# Patient Record
Sex: FEMALE
[De-identification: names, ages, dates, MRNs, and addresses within clinical notes are randomized per-mention and may not be internally consistent; named-entity substitution may affect disease eponyms.]

---

## 2019-01-24 PROBLEM — Z00.00 ENCOUNTER FOR PREVENTIVE HEALTH EXAMINATION: Status: ACTIVE | Noted: 2019-01-24

## 2019-02-19 ENCOUNTER — APPOINTMENT (OUTPATIENT)
Dept: OTOLARYNGOLOGY | Facility: CLINIC | Age: 39
End: 2019-02-19
Payer: COMMERCIAL

## 2019-02-19 VITALS
OXYGEN SATURATION: 98 % | HEIGHT: 64 IN | BODY MASS INDEX: 17.93 KG/M2 | WEIGHT: 105 LBS | DIASTOLIC BLOOD PRESSURE: 65 MMHG | HEART RATE: 70 BPM | SYSTOLIC BLOOD PRESSURE: 97 MMHG

## 2019-02-19 DIAGNOSIS — R22.0 LOCALIZED SWELLING, MASS AND LUMP, HEAD: ICD-10-CM

## 2019-02-19 DIAGNOSIS — Z87.891 PERSONAL HISTORY OF NICOTINE DEPENDENCE: ICD-10-CM

## 2019-02-19 PROCEDURE — 99202 OFFICE O/P NEW SF 15 MIN: CPT

## 2019-02-21 PROBLEM — R22.0 MASS OF TONGUE: Status: ACTIVE | Noted: 2019-02-21

## 2019-02-22 NOTE — HISTORY OF PRESENT ILLNESS
[FreeTextEntry1] : 37 y/o female with h/o midline dorsal tongue vascular lesion. Patient states the lesion was first noticed in year 2000. Pt states she was eating and her tongue began to bleed for hours, she proceeded to go to the ER. 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 Pt states after that episode it has bled about 1 x per year for the past 3 years. Pt uses pressure and/or salt water to stop the bleeding. 1.5 years ago in summer 2017 the lesion began to bleed and started to become very painful. The lesion increased in size and became more raised significantly about 1.5 years ago. Pt states the last time the lesion bled was a few months ago. Pt states she has pain which changes and increases with the weather, eating or menstruation. Pt went to an ENT doctor and MRI rx was given by Dr. Rod. Pt states Dr. Rod also ordered a CT scan and some other tests. Pt has also seen Dr. Rivera and Dr. Adrien Pitt, who felt the lesion should be surgically excised. Here for initial consultation.

## 2019-02-22 NOTE — PHYSICAL EXAM
[de-identified] : well adult. [de-identified] : midline dorsal mid tongue raised lesion (2 x 3 cm) with ulceration present

## 2019-02-22 NOTE — ASSESSMENT
[FreeTextEntry1] : 39 y/o female with h/o midline dorsal tongue lesion for last 19 years. Patient has been having bleeding and pain from the tongue lesion within last 1.5 years. Patient was recommended to have surgical excision of midline anterior tongue lesion. All risks and benefits discussed. Patient will call to schedule if interested.